# Patient Record
Sex: MALE | Race: WHITE | NOT HISPANIC OR LATINO | Employment: FULL TIME | URBAN - METROPOLITAN AREA
[De-identification: names, ages, dates, MRNs, and addresses within clinical notes are randomized per-mention and may not be internally consistent; named-entity substitution may affect disease eponyms.]

---

## 2018-11-28 RX ORDER — AZITHROMYCIN 250 MG/1
250 TABLET, FILM COATED ORAL DAILY
Qty: 20 TAB | Refills: 0 | Status: SHIPPED | OUTPATIENT
Start: 2018-11-28 | End: 2018-12-18

## 2019-10-01 PROBLEM — S83.519A RUPTURE OF ANTERIOR CRUCIATE LIGAMENT OF KNEE: Status: ACTIVE | Noted: 2019-10-01

## 2021-08-30 ENCOUNTER — TELEPHONE (OUTPATIENT)
Dept: CARDIOLOGY | Facility: MEDICAL CENTER | Age: 36
End: 2021-08-30

## 2021-08-30 NOTE — TELEPHONE ENCOUNTER
Called patient in regards to NP appointment with LION[facility] on 09/16/2021. Pt stated no Cardiologist has been seen, imaging that is cardiac related is within chart, along  with blood work. Confirmed appointment time and location, pt verbally understood.

## 2021-09-16 ENCOUNTER — OFFICE VISIT (OUTPATIENT)
Dept: CARDIOLOGY | Facility: PHYSICIAN GROUP | Age: 36
End: 2021-09-16
Payer: COMMERCIAL

## 2021-09-16 VITALS
SYSTOLIC BLOOD PRESSURE: 108 MMHG | BODY MASS INDEX: 37.79 KG/M2 | DIASTOLIC BLOOD PRESSURE: 70 MMHG | OXYGEN SATURATION: 97 % | WEIGHT: 279 LBS | HEIGHT: 72 IN | HEART RATE: 65 BPM

## 2021-09-16 DIAGNOSIS — Z82.49 FAMILY HISTORY OF ISCHEMIC HEART DISEASE: ICD-10-CM

## 2021-09-16 PROBLEM — J34.2 NASAL SEPTAL DEVIATION: Status: ACTIVE | Noted: 2021-08-31

## 2021-09-16 PROBLEM — J34.3 HYPERTROPHY OF BOTH INFERIOR NASAL TURBINATES: Status: ACTIVE | Noted: 2021-08-31

## 2021-09-16 PROBLEM — J32.9 RECURRENT SINUS INFECTIONS: Status: ACTIVE | Noted: 2021-07-01

## 2021-09-16 PROBLEM — Z86.69 HISTORY OF RECURRENT EAR INFECTION: Status: ACTIVE | Noted: 2021-07-01

## 2021-09-16 PROCEDURE — 99202 OFFICE O/P NEW SF 15 MIN: CPT | Performed by: INTERNAL MEDICINE

## 2021-09-16 NOTE — PATIENT INSTRUCTIONS
Please consider following these simple instructions to improve your dietary choices.  1.  Eat whole foods (fruits/vegetables from the produce section without processing)  2.  Eat foods of different colors  3.  Don't buy processed/packaged food with more than 5 ingredients on the label, or multiple ingredients you don't recognize  4.  Minimize sugar.  This means any product which has added sugar in the label (soda, candy, and many processed foods).      Please increase aerobic exercise to 30 minutes 5 times a week or 75 minutes of high intensity exercise once a week.     Please get yearly fasting cholesterol panel and glucose checks.     Consider a coronary calcium score at age 41 through your PCP.

## 2021-09-16 NOTE — PROGRESS NOTES
Cardiology Initial Consultation Note    Date of note:    9/16/2021    Primary Care Provider: Pcp Not In Computer  Referring Provider: Robinson Dennison Jr., MD     Patient Name: Srikanth Rivers   YOB: 1985  MRN:              [id number]    Chief Complaint: cardiovascular risk assessment.     History of Present Illness: Srikanth Rivers is a 36 y.o. male whose current medical problems include obesity who is here for cardiac consultation for FH of early CAD.    Father had early CAD, same as many other family members. Never symptoms.     Patient denies chest pain, palpitations, dyspnea on exertion, pre-syncope, syncope, lower extremity swelling, orthopnea, PND or recent weight gain.       Runs or rides bike for exercise.      ROS  Constitution: Negative for chills, fever and night sweats.   HENT: Negative for nosebleeds.    Eyes: Negative for vision loss in left eye and vision loss in right eye.   Respiratory: Negative for hemoptysis.    Gastrointestinal: Negative for hematemesis, hematochezia and melena.   Genitourinary: Negative for hematuria.   Neurological: Negative for focal weakness, numbness and paresthesias.     All other systems reviewed and discussed using a comprehensive questionnaire and are negative.       Past Medical History:   Diagnosis Date   • Fracture    • Hx MRSA infection 4/2013    small area on face. treated with abx for 1 month and cleared         Past Surgical History:   Procedure Laterality Date   • KNEE ARTHROSCOPY      left knee   • KNEE ARTHROSCOPY      right knee x2   • PB KNEE SCOPE, ALLOGRAFT IMPANT           No current outpatient medications on file.     No current facility-administered medications for this visit.         Allergies   Allergen Reactions   • Vicodin [Hydrocodone-Acetaminophen] Itching         Family History   Problem Relation Age of Onset   • Heart Attack Father 49        CABG   • Heart Attack Paternal Aunt    • Heart Attack Paternal Grandfather          presumed sudden cardiac death         Social History     Socioeconomic History   • Marital status: Single     Spouse name: Not on file   • Number of children: Not on file   • Years of education: Not on file   • Highest education level: Not on file   Occupational History   • Not on file   Tobacco Use   • Smoking status: Never Smoker   • Smokeless tobacco: Current User   • Tobacco comment: chew   Vaping Use   • Vaping Use: Never used   Substance and Sexual Activity   • Alcohol use: Yes     Alcohol/week: 2.4 - 3.0 oz     Types: 4 - 5 Standard drinks or equivalent per week     Comment: occassional   • Drug use: No   • Sexual activity: Not on file   Other Topics Concern   • Not on file   Social History Narrative    , paramedic, worked on [employer].      Social Determinants of Health     Financial Resource Strain:    • Difficulty of Paying Living Expenses:    Food Insecurity:    • Worried About Running Out of Food in the Last Year:    • Ran Out of Food in the Last Year:    Transportation Needs:    • Lack of Transportation (Medical):    • Lack of Transportation (Non-Medical):    Physical Activity:    • Days of Exercise per Week:    • Minutes of Exercise per Session:    Stress:    • Feeling of Stress :    Social Connections:    • Frequency of Communication with Friends and Family:    • Frequency of Social Gatherings with Friends and Family:    • Attends Christianity Services:    • Active Member of Clubs or Organizations:    • Attends Club or Organization Meetings:    • Marital Status:    Intimate Partner Violence:    • Fear of Current or Ex-Partner:    • Emotionally Abused:    • Physically Abused:    • Sexually Abused:          Physical Exam:  Ambulatory Vitals  /70 (BP Location: Left arm, Patient Position: Sitting, BP Cuff Size: Adult)   Pulse 65   Ht 1.829 m (6')   Wt (!) 127 kg (279 lb)   SpO2 97%    Oxygen Therapy:     BP Readings from Last 4 Encounters:   09/16/21 108/70   12/16/19 138/86    19 128/84   19 138/88       Weight/BMI: Body mass index is 37.84 kg/m².  Wt Readings from Last 4 Encounters:   21 (!) 127 kg (279 lb)   19 120 kg (264 lb)   19 119 kg (263 lb)   19 122 kg (269 lb)       General: No apparent distress  Eyes: nl conjunctiva  ENT: OP covered by mask  Neck: JVP <8 cm H2O, no carotid bruits  Lungs: normal respiratory effort, CTAB  Heart: RRR, no murmurs, no rubs or gallops, no edema bilateral lower extremities. No LV/RV heave on cardiac palpatation. 2+ bilateral radial pulses.    Abdomen: soft, non tender, non distended, no masses, normal bowel sounds.  No HSM.  Extremities/MSK: no clubbing, no cyanosis  Neurological: No focal sensory deficits  Psychiatric: Appropriate affect, A/O x 3, intact judgement and insight  Skin: Warm extremities      Lab Data Review:  Lab Results   Component Value Date/Time    CHOLSTRLTOT 179 2019 08:00 AM     (H) 2019 08:00 AM    HDL 45.0 2019 08:00 AM    TRIGLYCERIDE 120 2019 08:00 AM       Lab Results   Component Value Date/Time    SODIUM 138 2019 08:00 AM    POTASSIUM 4.0 2019 08:00 AM    CHLORIDE 101 2019 08:00 AM    CO2 26 2019 08:00 AM    GLUCOSE 90 2019 08:00 AM    BUN 16 2019 08:00 AM    CREATININE 1.3 2019 08:00 AM    CREATININE 1.4 2008 08:42 AM     Lab Results   Component Value Date/Time    ALKPHOSPHAT 56 2019 08:00 AM    ASTSGOT 33 2019 08:00 AM    ALTSGPT 49 2019 08:00 AM    TBILIRUBIN 0.6 2019 08:00 AM      Lab Results   Component Value Date/Time    WBC 9.3 2008 08:42 AM     No components found for: HBGA1C  No components found for: TROPONIN  No results found for: BNP        Medical Decision Makin. Family history of ischemic heart disease  Discussed at length the importance of diet, exercise, normotension, and avoidance of dyslipidemia and diabetes as well as maintaining a normal weight.   -check  lipid panel, hgbA1c  -increase exercise  -decrease animal products in diet, increase whole plant based foods  -CCS at age 41 through PCP.     Return if symptoms worsen or fail to improve.      Julito Jackson MD, [facility], [facility].  [address] [address]  Phone: [phone]  Fax: [phone]

## 2024-02-05 ENCOUNTER — APPOINTMENT (RX ONLY)
Dept: URBAN - METROPOLITAN AREA CLINIC 20 | Facility: CLINIC | Age: 39
Setting detail: DERMATOLOGY
End: 2024-02-05

## 2024-02-05 DIAGNOSIS — L81.4 OTHER MELANIN HYPERPIGMENTATION: ICD-10-CM

## 2024-02-05 DIAGNOSIS — D22 MELANOCYTIC NEVI: ICD-10-CM

## 2024-02-05 DIAGNOSIS — L82.1 OTHER SEBORRHEIC KERATOSIS: ICD-10-CM

## 2024-02-05 DIAGNOSIS — D18.0 HEMANGIOMA: ICD-10-CM

## 2024-02-05 DIAGNOSIS — Z71.89 OTHER SPECIFIED COUNSELING: ICD-10-CM

## 2024-02-05 PROBLEM — D22.62 MELANOCYTIC NEVI OF LEFT UPPER LIMB, INCLUDING SHOULDER: Status: ACTIVE | Noted: 2024-02-05

## 2024-02-05 PROBLEM — D22.72 MELANOCYTIC NEVI OF LEFT LOWER LIMB, INCLUDING HIP: Status: ACTIVE | Noted: 2024-02-05

## 2024-02-05 PROBLEM — D22.61 MELANOCYTIC NEVI OF RIGHT UPPER LIMB, INCLUDING SHOULDER: Status: ACTIVE | Noted: 2024-02-05

## 2024-02-05 PROBLEM — D22.39 MELANOCYTIC NEVI OF OTHER PARTS OF FACE: Status: ACTIVE | Noted: 2024-02-05

## 2024-02-05 PROBLEM — D23.62 OTHER BENIGN NEOPLASM OF SKIN OF LEFT UPPER LIMB, INCLUDING SHOULDER: Status: ACTIVE | Noted: 2024-02-05

## 2024-02-05 PROBLEM — D22.5 MELANOCYTIC NEVI OF TRUNK: Status: ACTIVE | Noted: 2024-02-05

## 2024-02-05 PROBLEM — D22.71 MELANOCYTIC NEVI OF RIGHT LOWER LIMB, INCLUDING HIP: Status: ACTIVE | Noted: 2024-02-05

## 2024-02-05 PROBLEM — D18.01 HEMANGIOMA OF SKIN AND SUBCUTANEOUS TISSUE: Status: ACTIVE | Noted: 2024-02-05

## 2024-02-05 PROCEDURE — 99203 OFFICE O/P NEW LOW 30 MIN: CPT

## 2024-02-05 PROCEDURE — ? SUNSCREEN RECOMMENDATIONS

## 2024-02-05 PROCEDURE — ? COUNSELING

## 2024-02-05 ASSESSMENT — LOCATION DETAILED DESCRIPTION DERM
LOCATION DETAILED: INFERIOR THORACIC SPINE
LOCATION DETAILED: RIGHT SUPERIOR UPPER BACK
LOCATION DETAILED: RIGHT INFERIOR CENTRAL MALAR CHEEK
LOCATION DETAILED: RIGHT VENTRAL PROXIMAL FOREARM
LOCATION DETAILED: LEFT LATERAL PROXIMAL PRETIBIAL REGION
LOCATION DETAILED: RIGHT DISTAL POSTERIOR UPPER ARM
LOCATION DETAILED: RIGHT DISTAL POSTERIOR THIGH
LOCATION DETAILED: RIGHT INFERIOR MEDIAL UPPER BACK
LOCATION DETAILED: LEFT VENTRAL PROXIMAL FOREARM
LOCATION DETAILED: RIGHT INFERIOR FOREHEAD
LOCATION DETAILED: RIGHT INFERIOR MEDIAL MIDBACK
LOCATION DETAILED: LEFT DISTAL POSTERIOR THIGH
LOCATION DETAILED: RIGHT PROXIMAL PRETIBIAL REGION
LOCATION DETAILED: LEFT PROXIMAL POSTERIOR UPPER ARM

## 2024-02-05 ASSESSMENT — LOCATION SIMPLE DESCRIPTION DERM
LOCATION SIMPLE: RIGHT FOREARM
LOCATION SIMPLE: LEFT POSTERIOR THIGH
LOCATION SIMPLE: RIGHT PRETIBIAL REGION
LOCATION SIMPLE: RIGHT POSTERIOR THIGH
LOCATION SIMPLE: RIGHT FOREHEAD
LOCATION SIMPLE: LEFT PRETIBIAL REGION
LOCATION SIMPLE: UPPER BACK
LOCATION SIMPLE: LEFT FOREARM
LOCATION SIMPLE: LEFT POSTERIOR UPPER ARM
LOCATION SIMPLE: RIGHT CHEEK
LOCATION SIMPLE: RIGHT UPPER BACK
LOCATION SIMPLE: RIGHT POSTERIOR UPPER ARM
LOCATION SIMPLE: RIGHT LOWER BACK

## 2024-02-05 ASSESSMENT — LOCATION ZONE DERM
LOCATION ZONE: LEG
LOCATION ZONE: TRUNK
LOCATION ZONE: ARM
LOCATION ZONE: FACE

## 2024-03-14 ENCOUNTER — OFFICE VISIT (OUTPATIENT)
Dept: URGENT CARE | Facility: CLINIC | Age: 39
End: 2024-03-14
Payer: COMMERCIAL

## 2024-03-14 VITALS
WEIGHT: 280 LBS | SYSTOLIC BLOOD PRESSURE: 138 MMHG | TEMPERATURE: 97.8 F | RESPIRATION RATE: 16 BRPM | HEART RATE: 87 BPM | HEIGHT: 72 IN | BODY MASS INDEX: 37.93 KG/M2 | OXYGEN SATURATION: 98 % | DIASTOLIC BLOOD PRESSURE: 74 MMHG

## 2024-03-14 DIAGNOSIS — R06.2 WHEEZING: ICD-10-CM

## 2024-03-14 DIAGNOSIS — R05.8 POST-VIRAL COUGH SYNDROME: ICD-10-CM

## 2024-03-14 DIAGNOSIS — J40 BRONCHITIS: ICD-10-CM

## 2024-03-14 DIAGNOSIS — J02.9 PHARYNGITIS, UNSPECIFIED ETIOLOGY: Primary | ICD-10-CM

## 2024-03-14 LAB — S PYO DNA SPEC NAA+PROBE: NOT DETECTED

## 2024-03-14 PROCEDURE — 99204 OFFICE O/P NEW MOD 45 MIN: CPT

## 2024-03-14 PROCEDURE — 3078F DIAST BP <80 MM HG: CPT

## 2024-03-14 PROCEDURE — 3075F SYST BP GE 130 - 139MM HG: CPT

## 2024-03-14 PROCEDURE — 87651 STREP A DNA AMP PROBE: CPT

## 2024-03-14 RX ORDER — TESTOSTERONE CYPIONATE 200 MG/ML
INJECTION, SOLUTION INTRAMUSCULAR
COMMUNITY
Start: 2023-10-05

## 2024-03-14 RX ORDER — BENZONATATE 100 MG/1
100 CAPSULE ORAL 3 TIMES DAILY PRN
Qty: 60 CAPSULE | Refills: 0 | Status: SHIPPED | OUTPATIENT
Start: 2024-03-14

## 2024-03-14 RX ORDER — DEXTROMETHORPHAN HYDROBROMIDE AND PROMETHAZINE HYDROCHLORIDE 15; 6.25 MG/5ML; MG/5ML
5 SYRUP ORAL
Qty: 118 ML | Refills: 0 | Status: SHIPPED | OUTPATIENT
Start: 2024-03-14

## 2024-03-14 RX ORDER — ALBUTEROL SULFATE 90 UG/1
2 AEROSOL, METERED RESPIRATORY (INHALATION) EVERY 6 HOURS PRN
Qty: 1 EACH | Refills: 0 | Status: SHIPPED | OUTPATIENT
Start: 2024-03-14

## 2024-03-14 RX ORDER — ANASTROZOLE 1 MG/1
TABLET ORAL
COMMUNITY
Start: 2023-10-06

## 2024-03-14 RX ORDER — PREDNISONE 10 MG/1
40 TABLET ORAL DAILY
Qty: 20 TABLET | Refills: 0 | Status: SHIPPED | OUTPATIENT
Start: 2024-03-14 | End: 2024-03-19

## 2024-03-14 ASSESSMENT — ENCOUNTER SYMPTOMS
SHORTNESS OF BREATH: 1
ABDOMINAL PAIN: 0
VOMITING: 0
SPUTUM PRODUCTION: 0
SORE THROAT: 1
HEADACHES: 0
NAUSEA: 0
MYALGIAS: 0
EYE DISCHARGE: 0
STRIDOR: 0
EYE REDNESS: 0
FEVER: 0
EYE PAIN: 0
PALPITATIONS: 0
CHILLS: 0
DIZZINESS: 0
WHEEZING: 1
DIARRHEA: 0
COUGH: 1

## 2024-03-14 NOTE — PROGRESS NOTES
Subjective:   Srikanth Rivers is a 38 y.o. male who presents for Cough (Sx x on going ) and Pharyngitis          I introduced myself to the patient and informed them that I am a family nurse practitioner.    HPI: Srikanth is a 38-year-old male who comes in today c/o had chills, nasal congestion, cough, sore throat, eye redness with thick purulent drainage 3 weeks ago. Conjunctivitis and all other symptoms resolved except the cough and sore throat which have persisted and worsened.  Patient describes symptoms as constant, worsening. They describe the cough as nonproductive. Aggravating factors include worse at night, worse when lying flat. Relieving factors include sleeping propped up. Treatments tried at home include cold and flu medicine initially with poor relief. They describe their symptoms as moderate. Denies any known exposure to Covid, Flu, RSV, strep. Denies anyone else is sick at home presently.        Review of Systems   Constitutional:  Negative for chills, fever and malaise/fatigue.   HENT:  Positive for sore throat. Negative for congestion and ear pain.    Eyes:  Negative for pain, discharge and redness.   Respiratory:  Positive for cough, shortness of breath and wheezing. Negative for sputum production and stridor.    Cardiovascular:  Negative for chest pain and palpitations.   Gastrointestinal:  Negative for abdominal pain, diarrhea, nausea and vomiting.   Genitourinary:  Negative for dysuria.   Musculoskeletal:  Negative for myalgias.   Skin:  Negative for rash.   Neurological:  Negative for dizziness and headaches.       Medications: anastrozole Tabs  testosterone cypionate     Allergies: Vicodin [hydrocodone-acetaminophen]    Problem List: does not have any pertinent problems on file.    Surgical History:  Past Surgical History:   Procedure Laterality Date    KNEE ARTHROSCOPY      left knee    KNEE ARTHROSCOPY      right knee x2    AL KNEE SCOPE, ALLOGRAFT IMPANT         Past Social Hx:   reports  that he has never smoked. He uses smokeless tobacco. He reports current alcohol use of about 2.4 - 3.0 oz of alcohol per week. He reports that he does not use drugs.     Past Family Hx:   family history includes Heart Attack in his paternal aunt and paternal grandfather; Heart Attack (age of onset: 49) in his father.     Problem list, medications, and allergies reviewed by myself today in Epic.   I have documented what I find to be significant in regards to past medical, social, family and surgical history  in my HPI or under PMH/PSH/FH review section, otherwise it is noncontributory     Objective:     /74 (BP Location: Left arm, Patient Position: Sitting, BP Cuff Size: Large adult)   Pulse 87   Temp 36.6 °C (97.8 °F) (Temporal)   Resp 16   Ht 1.829 m (6')   Wt (!) 127 kg (280 lb)   SpO2 98%   BMI 37.97 kg/m²     During this visit, appropriate PPE was worn, and hand hygiene was performed.    Physical Exam  Vitals reviewed.   Constitutional:       General: He is not in acute distress.     Appearance: Normal appearance. He is not ill-appearing or toxic-appearing.   HENT:      Head: Normocephalic and atraumatic.      Right Ear: Tympanic membrane, ear canal and external ear normal. There is no impacted cerumen.      Left Ear: Tympanic membrane, ear canal and external ear normal. There is no impacted cerumen.      Nose: No congestion or rhinorrhea.      Mouth/Throat:      Pharynx: Oropharynx is clear. Posterior oropharyngeal erythema present. No oropharyngeal exudate.      Comments: Tonsillar swelling 1+ bilaterally with mild erythema, no exudates.  There is mild posterior oropharyngeal erythema present, no cobblestoning or exudates.  No soft tissue swelling of the sublingual mucosa, no petechia or swelling of the soft or hard palate, no unilarteral oropharynx swelling, no sign of tonsillar stone, epiglottitis, or abscess.  Airway is patent and there is no stridor.  Patient is managing oral secretions  appropriately.  Uvula is midline and appropriate size with no erythema or edema.    Eyes:      General: No scleral icterus.        Right eye: No discharge.         Left eye: No discharge.      Extraocular Movements: Extraocular movements intact.      Conjunctiva/sclera: Conjunctivae normal.      Pupils: Pupils are equal, round, and reactive to light.   Cardiovascular:      Rate and Rhythm: Normal rate and regular rhythm.      Heart sounds: Normal heart sounds. No murmur heard.     No friction rub. No gallop.   Pulmonary:      Effort: Pulmonary effort is normal. No respiratory distress.      Breath sounds: No stridor. Wheezing present. No rhonchi or rales.      Comments: Scattered expiratory wheezes present, partially relieved on coughing  Abdominal:      General: There is no distension.   Musculoskeletal:         General: Normal range of motion.      Cervical back: Normal range of motion. No rigidity.      Right lower leg: No edema.      Left lower leg: No edema.   Lymphadenopathy:      Cervical: No cervical adenopathy.   Skin:     General: Skin is warm and dry.      Coloration: Skin is not jaundiced.   Neurological:      General: No focal deficit present.      Mental Status: He is alert and oriented to person, place, and time. Mental status is at baseline.   Psychiatric:         Mood and Affect: Mood normal.         Behavior: Behavior normal.         Thought Content: Thought content normal.         Judgment: Judgment normal.         Lab Results/POC Test Results   Results for orders placed or performed in visit on 03/14/24   POCT CEPHEID GROUP A STREP - PCR   Result Value Ref Range    POC Group A Strep, PCR Not Detected Not Detected, Invalid           Assessment/Plan:     Diagnosis and associated orders:     1. Pharyngitis, unspecified etiology  POCT CEPHEID GROUP A STREP - PCR      2. Post-viral cough syndrome  predniSONE (DELTASONE) 10 MG Tab    benzonatate (TESSALON) 100 MG Cap    promethazine-dextromethorphan  (PROMETHAZINE-DM) 6.25-15 MG/5ML syrup      3. Bronchitis  predniSONE (DELTASONE) 10 MG Tab      4. Wheezing  albuterol 108 (90 Base) MCG/ACT Aero Soln inhalation aerosol         Comments/MDM:     1. Post-viral cough syndrome  Discussed with patient diagnosis of postviral cough syndrome/bronchitis which can linger for 4 weeks or more after the initial viral URI.  Patient states cough is troublesome and is asking for something to suppress it, especially during the night when cough is keeping them awake.  Instruct them regarding Tessalon pearls, purpose, side effects, precautions, may take it during daytime, will not make you drowsy;  Instructed patient regarding Promethazine DM for nighttime to help with sleep and cough suppression, purpose, S/E, precautions including the sedating effects of promethazine. They state they understand, they feel that the benefits at this point will outweigh the risks, would like to go ahead with the prescription as they state they need to get some sleep.     - predniSONE (DELTASONE) 10 MG Tab; Take 4 Tablets by mouth every day for 5 days.  Dispense: 20 Tablet; Refill: 0  - benzonatate (TESSALON) 100 MG Cap; Take 1 Capsule by mouth 3 times a day as needed for Cough.  Dispense: 60 Capsule; Refill: 0  - promethazine-dextromethorphan (PROMETHAZINE-DM) 6.25-15 MG/5ML syrup; Take 5 mL by mouth at bedtime as needed for Cough.  Dispense: 118 mL; Refill: 0    2. Bronchitis    We discussed viral versus bacterial infection, and I informed patient that they have a self-limiting viral URI at this time and antibiotics are not an effective treatment for this.    I instructed them that the management for this is supportive care-we discussed cough/deep breathing exercises, drink plenty of fluids, get plenty of rest, try a humidifier in bedroom at night to help them sleep, gargle with salt water/honey/OTC Cepacol lozenges to help ease sore throat.    I instr patient they may use OTC cold and flu meds to  treat symptoms  (caution regarding checking ingredients as some meds contain tylenol); may use tylenol alternated with ibuprofen for pain/fever - may take tylenol 1000mg every 6 hours, do not exceed 3000 mg in 24 hours; ibuprofen (if not contraindicated) start with lowest effective dose, 200mg every 8 hours, may increase to up to 400 mg every 8 hours if needed, take with food.  Patient was instructed that they should feel better in 7-10 days, (but some viral illnesses can last 2 weeks or longer, with residual cough possible for over a month) but to return to clinic if symptoms do not improve or worsen after 10-14 days.   We did discuss red flags and when to return to urgent care versus when to go to the emergency room and strict ER precautions were given.    I did print written instructions for patient, and did go over the diagnosis including differentials, and side effects of each medication with them and answer their questions to the best of my ability.   Advised the patient to follow-up with the primary care physician for recheck, reevaluation, and consideration of further management.  Strict ER precautions discussed for any  chest pain, difficulty breathing, difficulty swallowing, wheezing, stridor, or drooling, fever that does not respond to ibuprofen and Tylenol, inability to tolerate fluids, dehydration, lethargy.    Patient states they have good understanding and they are agreeable with the plan of care.     - predniSONE (DELTASONE) 10 MG Tab; Take 4 Tablets by mouth every day for 5 days.  Dispense: 20 Tablet; Refill: 0    3. Wheezing  Instructed patient regarding correct use of albuterol inhaler.  States he has good understanding  - albuterol 108 (90 Base) MCG/ACT Aero Soln inhalation aerosol; Inhale 2 Puffs every 6 hours as needed for Shortness of Breath.  Dispense: 1 Each; Refill: 0    4. Pharyngitis, unspecified etiology  I discussed with patient I will call them only if PCR testing in clinic today is  positive and we need to discuss further treatment otherwise the pharyngitis will be considered viral and treatment will be the supportive care measures we discussed in clinic today.  Results will be available on [facility] if they have this set up.  They state they understand and are agreeable.  - POCT CEPHEID GROUP A STREP - PCR  - predniSONE (DELTASONE) 10 MG Tab; Take 4 Tablets by mouth every day for 5 days.  Dispense: 20 Tablet; Refill: 0         Pt is clinically stable at today's acute urgent care visit. Vital signs are normal and reassuring.  No acute distress noted. Appropriate for outpatient management at this time.        I personally reviewed prior external notes and test results pertinent to today's visit.  I have independently reviewed and interpreted all diagnostics ordered during this urgent care acute visit.        Please note that this dictation was created using voice recognition software. I have made a reasonable attempt to correct obvious errors, but I expect that there are errors of grammar and possibly content that I did not discover before finalizing the note.    This note was electronically signed by Jamison ROWLEY, VIVIAN, MUSA, ELIZABETH

## 2025-01-30 ENCOUNTER — HOSPITAL ENCOUNTER (OUTPATIENT)
Dept: LAB | Facility: MEDICAL CENTER | Age: 40
End: 2025-01-30
Attending: STUDENT IN AN ORGANIZED HEALTH CARE EDUCATION/TRAINING PROGRAM
Payer: COMMERCIAL

## 2025-01-30 LAB
ALBUMIN SERPL BCP-MCNC: 4.5 G/DL (ref 3.2–4.9)
ALP SERPL-CCNC: 41 U/L (ref 30–99)
ALT SERPL-CCNC: 53 U/L (ref 2–50)
AST SERPL-CCNC: 40 U/L (ref 12–45)
BILIRUB CONJ SERPL-MCNC: <0.2 MG/DL (ref 0.1–0.5)
BILIRUB INDIRECT SERPL-MCNC: ABNORMAL MG/DL (ref 0–1)
BILIRUB SERPL-MCNC: 0.4 MG/DL (ref 0.1–1.5)
CHOLEST SERPL-MCNC: 181 MG/DL (ref 100–199)
HDLC SERPL-MCNC: 39 MG/DL
LDLC SERPL CALC-MCNC: 128 MG/DL
PROT SERPL-MCNC: 7.4 G/DL (ref 6–8.2)
TRIGL SERPL-MCNC: 69 MG/DL (ref 0–149)

## 2025-01-30 PROCEDURE — 80076 HEPATIC FUNCTION PANEL: CPT

## 2025-01-30 PROCEDURE — 80061 LIPID PANEL: CPT

## 2025-01-30 PROCEDURE — 36415 COLL VENOUS BLD VENIPUNCTURE: CPT

## 2025-01-30 PROCEDURE — 86480 TB TEST CELL IMMUN MEASURE: CPT

## 2025-01-31 LAB
GAMMA INTERFERON BACKGROUND BLD IA-ACNC: 0.03 IU/ML
M TB IFN-G BLD-IMP: NEGATIVE
M TB IFN-G CD4+ BCKGRND COR BLD-ACNC: 0 IU/ML
MITOGEN IGNF BCKGRD COR BLD-ACNC: >10 IU/ML
QFT TB2 - NIL TBQ2: -0.01 IU/ML